# Patient Record
Sex: FEMALE | Race: ASIAN
[De-identification: names, ages, dates, MRNs, and addresses within clinical notes are randomized per-mention and may not be internally consistent; named-entity substitution may affect disease eponyms.]

---

## 2019-05-22 PROBLEM — Z00.00 ENCOUNTER FOR PREVENTIVE HEALTH EXAMINATION: Status: ACTIVE | Noted: 2019-05-22

## 2019-06-04 ENCOUNTER — APPOINTMENT (OUTPATIENT)
Dept: ANTEPARTUM | Facility: CLINIC | Age: 34
End: 2019-06-04
Payer: MEDICAID

## 2019-06-04 ENCOUNTER — APPOINTMENT (OUTPATIENT)
Dept: MATERNAL FETAL MEDICINE | Facility: CLINIC | Age: 34
End: 2019-06-04

## 2019-06-04 ENCOUNTER — ASOB RESULT (OUTPATIENT)
Age: 34
End: 2019-06-04

## 2019-06-04 PROCEDURE — 76813 OB US NUCHAL MEAS 1 GEST: CPT

## 2019-06-04 PROCEDURE — 36416 COLLJ CAPILLARY BLOOD SPEC: CPT

## 2019-06-06 ENCOUNTER — ASOB RESULT (OUTPATIENT)
Age: 34
End: 2019-06-06

## 2019-06-06 ENCOUNTER — APPOINTMENT (OUTPATIENT)
Dept: MATERNAL FETAL MEDICINE | Facility: CLINIC | Age: 34
End: 2019-06-06
Payer: MEDICAID

## 2019-06-06 PROCEDURE — 99201 OFFICE OUTPATIENT NEW 10 MINUTES: CPT

## 2019-06-15 LAB
1ST TRIMESTER DATA: NORMAL
ADDENDUM DOC: NORMAL
AFP PNL SERPL: NORMAL
AFP SERPL-ACNC: NORMAL
CLINICAL BIOCHEMIST REVIEW: NORMAL
FREE BETA HCG 1ST TRIMESTER: NORMAL
Lab: NORMAL
NOTES NTD: NORMAL
NT: NORMAL
PAPP-A SERPL-ACNC: NORMAL
TRISOMY 18/3: NORMAL

## 2019-08-07 ENCOUNTER — APPOINTMENT (OUTPATIENT)
Dept: ANTEPARTUM | Facility: CLINIC | Age: 34
End: 2019-08-07
Payer: MEDICAID

## 2019-08-07 ENCOUNTER — ASOB RESULT (OUTPATIENT)
Age: 34
End: 2019-08-07

## 2019-08-07 PROCEDURE — 76805 OB US >/= 14 WKS SNGL FETUS: CPT

## 2019-08-07 PROCEDURE — 76817 TRANSVAGINAL US OBSTETRIC: CPT

## 2019-10-31 ENCOUNTER — ASOB RESULT (OUTPATIENT)
Age: 34
End: 2019-10-31

## 2019-10-31 ENCOUNTER — APPOINTMENT (OUTPATIENT)
Dept: ANTEPARTUM | Facility: CLINIC | Age: 34
End: 2019-10-31
Payer: MEDICAID

## 2019-10-31 PROCEDURE — 76819 FETAL BIOPHYS PROFIL W/O NST: CPT

## 2019-10-31 PROCEDURE — 76816 OB US FOLLOW-UP PER FETUS: CPT

## 2019-12-10 ENCOUNTER — INPATIENT (INPATIENT)
Facility: HOSPITAL | Age: 34
LOS: 1 days | Discharge: HOME | DRG: 542 | End: 2019-12-12
Attending: OBSTETRICS & GYNECOLOGY | Admitting: OBSTETRICS & GYNECOLOGY
Payer: MEDICAID

## 2019-12-10 VITALS
HEART RATE: 65 BPM | SYSTOLIC BLOOD PRESSURE: 123 MMHG | RESPIRATION RATE: 18 BRPM | DIASTOLIC BLOOD PRESSURE: 66 MMHG | TEMPERATURE: 98 F

## 2019-12-10 DIAGNOSIS — Z34.90 ENCOUNTER FOR SUPERVISION OF NORMAL PREGNANCY, UNSPECIFIED, UNSPECIFIED TRIMESTER: ICD-10-CM

## 2019-12-10 LAB
BASOPHILS # BLD AUTO: 0.03 K/UL — SIGNIFICANT CHANGE UP (ref 0–0.2)
BASOPHILS NFR BLD AUTO: 0.3 % — SIGNIFICANT CHANGE UP (ref 0–2)
EOSINOPHIL # BLD AUTO: 0.02 K/UL — SIGNIFICANT CHANGE UP (ref 0–0.5)
EOSINOPHIL NFR BLD AUTO: 0.2 % — SIGNIFICANT CHANGE UP (ref 0–6)
HCT VFR BLD CALC: 35.2 % — SIGNIFICANT CHANGE UP (ref 34.5–45)
HGB BLD-MCNC: 11.2 G/DL — LOW (ref 11.5–15.5)
IMM GRANULOCYTES NFR BLD AUTO: 0.6 % — SIGNIFICANT CHANGE UP (ref 0–1.5)
LYMPHOCYTES # BLD AUTO: 2.69 K/UL — SIGNIFICANT CHANGE UP (ref 1–3.3)
LYMPHOCYTES # BLD AUTO: 23.4 % — SIGNIFICANT CHANGE UP (ref 13–44)
MCHC RBC-ENTMCNC: 21.6 PG — LOW (ref 27–34)
MCHC RBC-ENTMCNC: 31.8 GM/DL — LOW (ref 32–36)
MCV RBC AUTO: 67.8 FL — LOW (ref 80–100)
MONOCYTES # BLD AUTO: 0.56 K/UL — SIGNIFICANT CHANGE UP (ref 0–0.9)
MONOCYTES NFR BLD AUTO: 4.9 % — SIGNIFICANT CHANGE UP (ref 2–14)
NEUTROPHILS # BLD AUTO: 8.13 K/UL — HIGH (ref 1.8–7.4)
NEUTROPHILS NFR BLD AUTO: 70.6 % — SIGNIFICANT CHANGE UP (ref 43–77)
PLATELET # BLD AUTO: 240 K/UL — SIGNIFICANT CHANGE UP (ref 150–400)
RBC # BLD: 5.19 M/UL — SIGNIFICANT CHANGE UP (ref 3.8–5.2)
RBC # FLD: 14.6 % — HIGH (ref 10.3–14.5)
T PALLIDUM AB TITR SER: NEGATIVE — SIGNIFICANT CHANGE UP
WBC # BLD: 11.5 K/UL — HIGH (ref 3.8–10.5)
WBC # FLD AUTO: 11.5 K/UL — HIGH (ref 3.8–10.5)

## 2019-12-10 PROCEDURE — 86901 BLOOD TYPING SEROLOGIC RH(D): CPT

## 2019-12-10 PROCEDURE — G0463: CPT

## 2019-12-10 PROCEDURE — 86850 RBC ANTIBODY SCREEN: CPT

## 2019-12-10 PROCEDURE — 85014 HEMATOCRIT: CPT

## 2019-12-10 PROCEDURE — 86780 TREPONEMA PALLIDUM: CPT

## 2019-12-10 PROCEDURE — 85018 HEMOGLOBIN: CPT

## 2019-12-10 PROCEDURE — 36415 COLL VENOUS BLD VENIPUNCTURE: CPT

## 2019-12-10 PROCEDURE — C1889: CPT

## 2019-12-10 PROCEDURE — 94760 N-INVAS EAR/PLS OXIMETRY 1: CPT

## 2019-12-10 PROCEDURE — 86900 BLOOD TYPING SEROLOGIC ABO: CPT

## 2019-12-10 PROCEDURE — 59050 FETAL MONITOR W/REPORT: CPT

## 2019-12-10 PROCEDURE — 85025 COMPLETE CBC W/AUTO DIFF WBC: CPT

## 2019-12-10 RX ORDER — LANOLIN
1 OINTMENT (GRAM) TOPICAL EVERY 6 HOURS
Refills: 0 | Status: DISCONTINUED | OUTPATIENT
Start: 2019-12-10 | End: 2019-12-12

## 2019-12-10 RX ORDER — IBUPROFEN 200 MG
600 TABLET ORAL EVERY 6 HOURS
Refills: 0 | Status: DISCONTINUED | OUTPATIENT
Start: 2019-12-10 | End: 2019-12-12

## 2019-12-10 RX ORDER — HYDROCORTISONE 1 %
1 OINTMENT (GRAM) TOPICAL EVERY 6 HOURS
Refills: 0 | Status: DISCONTINUED | OUTPATIENT
Start: 2019-12-10 | End: 2019-12-12

## 2019-12-10 RX ORDER — GLYCERIN ADULT
1 SUPPOSITORY, RECTAL RECTAL AT BEDTIME
Refills: 0 | Status: DISCONTINUED | OUTPATIENT
Start: 2019-12-10 | End: 2019-12-10

## 2019-12-10 RX ORDER — SIMETHICONE 80 MG/1
80 TABLET, CHEWABLE ORAL EVERY 4 HOURS
Refills: 0 | Status: DISCONTINUED | OUTPATIENT
Start: 2019-12-10 | End: 2019-12-12

## 2019-12-10 RX ORDER — IBUPROFEN 200 MG
600 TABLET ORAL EVERY 6 HOURS
Refills: 0 | Status: COMPLETED | OUTPATIENT
Start: 2019-12-10 | End: 2020-11-07

## 2019-12-10 RX ORDER — DIBUCAINE 1 %
1 OINTMENT (GRAM) RECTAL EVERY 6 HOURS
Refills: 0 | Status: DISCONTINUED | OUTPATIENT
Start: 2019-12-10 | End: 2019-12-12

## 2019-12-10 RX ORDER — AER TRAVELER 0.5 G/1
1 SOLUTION RECTAL; TOPICAL EVERY 4 HOURS
Refills: 0 | Status: DISCONTINUED | OUTPATIENT
Start: 2019-12-10 | End: 2019-12-12

## 2019-12-10 RX ORDER — DIPHENHYDRAMINE HCL 50 MG
25 CAPSULE ORAL EVERY 6 HOURS
Refills: 0 | Status: DISCONTINUED | OUTPATIENT
Start: 2019-12-10 | End: 2019-12-12

## 2019-12-10 RX ORDER — TETANUS TOXOID, REDUCED DIPHTHERIA TOXOID AND ACELLULAR PERTUSSIS VACCINE, ADSORBED 5; 2.5; 8; 8; 2.5 [IU]/.5ML; [IU]/.5ML; UG/.5ML; UG/.5ML; UG/.5ML
0.5 SUSPENSION INTRAMUSCULAR ONCE
Refills: 0 | Status: DISCONTINUED | OUTPATIENT
Start: 2019-12-10 | End: 2019-12-12

## 2019-12-10 RX ORDER — OXYTOCIN 10 UNIT/ML
333.33 VIAL (ML) INJECTION
Qty: 20 | Refills: 0 | Status: DISCONTINUED | OUTPATIENT
Start: 2019-12-10 | End: 2019-12-12

## 2019-12-10 RX ORDER — ACETAMINOPHEN 500 MG
975 TABLET ORAL
Refills: 0 | Status: DISCONTINUED | OUTPATIENT
Start: 2019-12-10 | End: 2019-12-12

## 2019-12-10 RX ORDER — BENZOCAINE 10 %
1 GEL (GRAM) MUCOUS MEMBRANE EVERY 6 HOURS
Refills: 0 | Status: DISCONTINUED | OUTPATIENT
Start: 2019-12-10 | End: 2019-12-12

## 2019-12-10 RX ORDER — CITRIC ACID/SODIUM CITRATE 300-500 MG
15 SOLUTION, ORAL ORAL EVERY 6 HOURS
Refills: 0 | Status: DISCONTINUED | OUTPATIENT
Start: 2019-12-10 | End: 2019-12-10

## 2019-12-10 RX ORDER — SODIUM CHLORIDE 9 MG/ML
1000 INJECTION, SOLUTION INTRAVENOUS
Refills: 0 | Status: DISCONTINUED | OUTPATIENT
Start: 2019-12-10 | End: 2019-12-10

## 2019-12-10 RX ORDER — BUTORPHANOL TARTRATE 2 MG/ML
2 INJECTION, SOLUTION INTRAMUSCULAR; INTRAVENOUS ONCE
Refills: 0 | Status: DISCONTINUED | OUTPATIENT
Start: 2019-12-10 | End: 2019-12-10

## 2019-12-10 RX ORDER — PRAMOXINE HYDROCHLORIDE 150 MG/15G
1 AEROSOL, FOAM RECTAL EVERY 4 HOURS
Refills: 0 | Status: DISCONTINUED | OUTPATIENT
Start: 2019-12-10 | End: 2019-12-12

## 2019-12-10 RX ORDER — SODIUM CHLORIDE 9 MG/ML
3 INJECTION INTRAMUSCULAR; INTRAVENOUS; SUBCUTANEOUS EVERY 8 HOURS
Refills: 0 | Status: DISCONTINUED | OUTPATIENT
Start: 2019-12-10 | End: 2019-12-12

## 2019-12-10 RX ORDER — KETOROLAC TROMETHAMINE 30 MG/ML
30 SYRINGE (ML) INJECTION ONCE
Refills: 0 | Status: DISCONTINUED | OUTPATIENT
Start: 2019-12-10 | End: 2019-12-10

## 2019-12-10 RX ORDER — MAGNESIUM HYDROXIDE 400 MG/1
30 TABLET, CHEWABLE ORAL
Refills: 0 | Status: DISCONTINUED | OUTPATIENT
Start: 2019-12-10 | End: 2019-12-12

## 2019-12-10 RX ADMIN — Medication 1000 MILLIUNIT(S)/MIN: at 20:30

## 2019-12-10 RX ADMIN — SODIUM CHLORIDE 3 MILLILITER(S): 9 INJECTION INTRAMUSCULAR; INTRAVENOUS; SUBCUTANEOUS at 22:06

## 2019-12-10 RX ADMIN — SODIUM CHLORIDE 125 MILLILITER(S): 9 INJECTION, SOLUTION INTRAVENOUS at 08:53

## 2019-12-10 RX ADMIN — Medication 975 MILLIGRAM(S): at 22:08

## 2019-12-10 RX ADMIN — Medication 975 MILLIGRAM(S): at 23:00

## 2019-12-10 RX ADMIN — BUTORPHANOL TARTRATE 2 MILLIGRAM(S): 2 INJECTION, SOLUTION INTRAMUSCULAR; INTRAVENOUS at 08:50

## 2019-12-10 RX ADMIN — Medication 30 MILLIGRAM(S): at 17:49

## 2019-12-10 RX ADMIN — SODIUM CHLORIDE 125 MILLILITER(S): 9 INJECTION, SOLUTION INTRAVENOUS at 14:40

## 2019-12-10 RX ADMIN — BUTORPHANOL TARTRATE 2 MILLIGRAM(S): 2 INJECTION, SOLUTION INTRAMUSCULAR; INTRAVENOUS at 10:00

## 2019-12-11 ENCOUNTER — TRANSCRIPTION ENCOUNTER (OUTPATIENT)
Age: 34
End: 2019-12-11

## 2019-12-11 LAB
HCT VFR BLD CALC: 28.4 % — LOW (ref 34.5–45)
HGB BLD-MCNC: 9 G/DL — LOW (ref 11.5–15.5)

## 2019-12-11 RX ORDER — ACETAMINOPHEN 500 MG
3 TABLET ORAL
Qty: 0 | Refills: 0 | DISCHARGE
Start: 2019-12-11

## 2019-12-11 RX ORDER — BENZOCAINE 10 %
1 GEL (GRAM) MUCOUS MEMBRANE
Qty: 0 | Refills: 0 | DISCHARGE
Start: 2019-12-11

## 2019-12-11 RX ORDER — HYDROCORTISONE 1 %
1 OINTMENT (GRAM) TOPICAL
Qty: 0 | Refills: 0 | DISCHARGE
Start: 2019-12-11

## 2019-12-11 RX ORDER — AER TRAVELER 0.5 G/1
1 SOLUTION RECTAL; TOPICAL
Qty: 0 | Refills: 0 | DISCHARGE
Start: 2019-12-11

## 2019-12-11 RX ORDER — DIBUCAINE 1 %
1 OINTMENT (GRAM) RECTAL
Qty: 0 | Refills: 0 | DISCHARGE
Start: 2019-12-11

## 2019-12-11 RX ORDER — LANOLIN
1 OINTMENT (GRAM) TOPICAL
Qty: 0 | Refills: 0 | DISCHARGE
Start: 2019-12-11

## 2019-12-11 RX ORDER — IBUPROFEN 200 MG
1 TABLET ORAL
Qty: 0 | Refills: 0 | DISCHARGE
Start: 2019-12-11

## 2019-12-11 RX ORDER — PRAMOXINE HYDROCHLORIDE 150 MG/15G
1 AEROSOL, FOAM RECTAL
Qty: 0 | Refills: 0 | DISCHARGE
Start: 2019-12-11

## 2019-12-11 RX ORDER — SIMETHICONE 80 MG/1
1 TABLET, CHEWABLE ORAL
Qty: 0 | Refills: 0 | DISCHARGE
Start: 2019-12-11

## 2019-12-11 RX ADMIN — Medication 600 MILLIGRAM(S): at 14:58

## 2019-12-11 RX ADMIN — Medication 600 MILLIGRAM(S): at 04:59

## 2019-12-11 RX ADMIN — Medication 600 MILLIGRAM(S): at 15:00

## 2019-12-11 RX ADMIN — Medication 600 MILLIGRAM(S): at 04:24

## 2019-12-11 RX ADMIN — SODIUM CHLORIDE 3 MILLILITER(S): 9 INJECTION INTRAMUSCULAR; INTRAVENOUS; SUBCUTANEOUS at 22:00

## 2019-12-11 NOTE — DISCHARGE NOTE OB - PATIENT PORTAL LINK FT
You can access the FollowMyHealth Patient Portal offered by [facility] by registering at the following website: [email or web address]. By joining [facility]’s FollowMyHealth portal, you will also be able to view your health information using other applications (apps) compatible with our system.

## 2019-12-11 NOTE — DISCHARGE NOTE OB - CARE PROVIDER_API CALL
Lesley Braxton)  Obstetrics and Gynecology  [address], [address]  [place] [address]  Phone: [phone]  Fax: [phone]  Follow Up Time:

## 2019-12-11 NOTE — DISCHARGE NOTE OB - MEDICATION SUMMARY - MEDICATIONS TO TAKE
I will START or STAY ON the medications listed below when I get home from the hospital:    ibuprofen 600 mg oral tablet  -- 1 tab(s) by mouth every 6 hours, As needed, Moderate Pain (4 - 6)  -- Indication: For vaginal delivery /delivered     acetaminophen 325 mg oral tablet  -- 3 tab(s) by mouth , As needed, Moderate Pain (4 - 6)  -- Indication: For vaginal delivery /delivery     benzocaine 20% topical spray  -- 1 spray(s) on skin every 6 hours, As needed, for Perineal discomfort  -- Indication: For vaginal delivery /delivered     dibucaine 1% topical ointment  -- 1 application on skin every 6 hours, As needed, Perineal discomfort  -- Indication: For vaginal delivery/delivered     pramoxine 1% topical cream  -- 1 application on skin every 4 hours, As needed, Moderate Pain (4-6)  -- Indication: For vaginal delivery/delivered     hydrocortisone 1% topical cream  -- 1 application on skin every 6 hours, As needed, Moderate Pain (4-6)  -- Indication: For vaginal delivery /delivered     lanolin topical ointment  -- 1 application on skin every 6 hours, As needed, nipple soreness  -- Indication: For vaginal delivery/delivered     witch hazel 50% topical pad  -- 1 application on skin every 4 hours, As needed, Perineal discomfort  -- Indication: For vaginal delivery /delivered     Prenatal Multivitamins with Folic Acid 1 mg oral tablet  -- 1 tab(s) by mouth once a day  -- Indication: For vaginal dleivery /delivered     simethicone 80 mg oral tablet, chewable  -- 1 tab(s) by mouth every 4 hours, As needed, Gas  -- Indication: For vaginal delivery /delivered

## 2019-12-11 NOTE — PROGRESS NOTE ADULT - SUBJECTIVE AND OBJECTIVE BOX
pt is s/p  day # 1   c/o pain in her perineum area but she  said it is controlled when she takes pain medicine     on exam vss   breast nml b/l   abd is soft and ut is firm   perineum is in healing process   no calf tenderness b/l   a/p   s/p  day #1   3rd degree laceration in a healing process   routine post partum care   caleb loya md

## 2019-12-12 VITALS
DIASTOLIC BLOOD PRESSURE: 67 MMHG | RESPIRATION RATE: 16 BRPM | OXYGEN SATURATION: 99 % | HEART RATE: 65 BPM | TEMPERATURE: 98 F | SYSTOLIC BLOOD PRESSURE: 113 MMHG

## 2019-12-12 RX ADMIN — Medication 600 MILLIGRAM(S): at 06:34

## 2019-12-12 NOTE — PROGRESS NOTE ADULT - SUBJECTIVE AND OBJECTIVE BOX
Postpartum Note,     Subjective: 33yo  female, now PPD#2 s/p , seen and examined at bedside. No acute events overnight. Pain well controlled with current pain regimen. She is ambulating well and tolerating PO diet/fluids. She reports normal postpartum bleeding, describing bleed no more than regular menstrual period. She is voiding well and reports flatus/BM. Reports breastfeeding without difficulties and is supplementing with formula feeds. Denies fever, headache, changes in vision, chest pain, SOB, nausea, vomiting. Otherwise, patient feels well without additional complaints.       Physical exam:    Vital Signs Last 24 Hrs  T(C): 36.6 (12 Dec 2019 07:50), Max: 36.6 (11 Dec 2019 20:10)  T(F): 97.8 (12 Dec 2019 07:50), Max: 97.8 (11 Dec 2019 20:10)  HR: 65 (12 Dec 2019 07:50) (65 - 79)  BP: 113/67 (12 Dec 2019 07:50) (113/67 - 118/67)  RR: 16 (12 Dec 2019 07:50) (16 - 16)  SpO2: 99% (12 Dec 2019 07:50) (99% - 99%)    Gen: NAD  Breast: Soft, nontender, not engorged  Cardio: S1,S2 no murmurs  Lungs: CTA B/L, no wheezing  Abdomen: Soft, nontender, no distension, firm uterine fundus below the umbilicus.  Pelvic: Normal lochia noted  Ext: No calf tenderness bilaterally      LABS:                        9.0    x     )-----------( x        ( 11 Dec 2019 08:27 )             28.4           Allergies  No Known Allergies      MEDICATIONS  (STANDING):  diphtheria/tetanus/pertussis (acellular) Vaccine (ADAcel) 0.5 milliLiter(s) IntraMuscular once  oxytocin Infusion 333.333 milliUNIT(s)/Min (1000 mL/Hr) IV Continuous <Continuous>  oxytocin Infusion 333.333 milliUNIT(s)/Min (1000 mL/Hr) IV Continuous <Continuous>  prenatal multivitamin 1 Tablet(s) Oral daily  sodium chloride 0.9% lock flush 3 milliLiter(s) IV Push every 8 hours    MEDICATIONS  (PRN):  acetaminophen   Tablet .. 975 milliGRAM(s) Oral <User Schedule> PRN Moderate Pain (4 - 6)  benzocaine 20%/menthol 0.5% Spray 1 Spray(s) Topical every 6 hours PRN for Perineal discomfort  dibucaine 1% Ointment 1 Application(s) Topical every 6 hours PRN Perineal discomfort  diphenhydrAMINE 25 milliGRAM(s) Oral every 6 hours PRN Pruritus  hydrocortisone 1% Cream 1 Application(s) Topical every 6 hours PRN Moderate Pain (4-6)  ibuprofen  Tablet. 600 milliGRAM(s) Oral every 6 hours PRN Moderate Pain (4 - 6)  lanolin Ointment 1 Application(s) Topical every 6 hours PRN nipple soreness  magnesium hydroxide Suspension 30 milliLiter(s) Oral two times a day PRN Constipation  pramoxine 1%/zinc 5% Cream 1 Application(s) Topical every 4 hours PRN Moderate Pain (4-6)  simethicone 80 milliGRAM(s) Chew every 4 hours PRN Gas  witch hazel Pads 1 Application(s) Topical every 4 hours PRN Perineal discomfort        Assessment and Plan  33yo  female, now PPD#2 s/p , currently feeling well without complaints at this time.    -Routine post-partum care  -Pain well controlled, continue current pain regimen  -Encouraged ambulation  -Encouraged PO diet/fluids  -Encouraged breastfeeding  -Likely d/c today

## 2019-12-15 DIAGNOSIS — Z23 ENCOUNTER FOR IMMUNIZATION: ICD-10-CM

## 2019-12-15 DIAGNOSIS — Z3A.39 39 WEEKS GESTATION OF PREGNANCY: ICD-10-CM

## 2021-10-04 ENCOUNTER — APPOINTMENT (OUTPATIENT)
Dept: ANTEPARTUM | Facility: CLINIC | Age: 36
End: 2021-10-04
Payer: COMMERCIAL

## 2021-10-04 ENCOUNTER — ASOB RESULT (OUTPATIENT)
Age: 36
End: 2021-10-04

## 2021-10-04 PROCEDURE — 36416 COLLJ CAPILLARY BLOOD SPEC: CPT

## 2021-10-04 PROCEDURE — 76813 OB US NUCHAL MEAS 1 GEST: CPT

## 2021-10-08 LAB
1ST TRIMESTER DATA: NORMAL
ADDENDUM DOC: NORMAL
AFP PNL SERPL: NORMAL
AFP SERPL-ACNC: NORMAL
CLINICAL BIOCHEMIST REVIEW: NORMAL
FREE BETA HCG 1ST TRIMESTER: NORMAL
Lab: NORMAL
NASAL BONE: PRESENT
NOTES NTD: NORMAL
NT: NORMAL
PAPP-A SERPL-ACNC: NORMAL
TRISOMY 18/3: NORMAL

## 2021-11-30 ENCOUNTER — ASOB RESULT (OUTPATIENT)
Age: 36
End: 2021-11-30

## 2021-11-30 ENCOUNTER — APPOINTMENT (OUTPATIENT)
Dept: ANTEPARTUM | Facility: CLINIC | Age: 36
End: 2021-11-30
Payer: MEDICAID

## 2021-11-30 PROCEDURE — 76811 OB US DETAILED SNGL FETUS: CPT

## 2021-11-30 PROCEDURE — 76817 TRANSVAGINAL US OBSTETRIC: CPT

## 2021-12-01 ENCOUNTER — APPOINTMENT (OUTPATIENT)
Dept: MATERNAL FETAL MEDICINE | Facility: CLINIC | Age: 36
End: 2021-12-01
Payer: MEDICAID

## 2021-12-01 ENCOUNTER — ASOB RESULT (OUTPATIENT)
Age: 36
End: 2021-12-01

## 2021-12-01 PROCEDURE — 99202 OFFICE O/P NEW SF 15 MIN: CPT | Mod: 95

## 2021-12-01 PROCEDURE — 99212 OFFICE O/P EST SF 10 MIN: CPT | Mod: 95

## 2021-12-03 ENCOUNTER — APPOINTMENT (OUTPATIENT)
Dept: ANTEPARTUM | Facility: CLINIC | Age: 36
End: 2021-12-03

## 2021-12-07 ENCOUNTER — APPOINTMENT (OUTPATIENT)
Dept: ANTEPARTUM | Facility: CLINIC | Age: 36
End: 2021-12-07
Payer: MEDICAID

## 2021-12-07 ENCOUNTER — ASOB RESULT (OUTPATIENT)
Age: 36
End: 2021-12-07

## 2021-12-07 PROCEDURE — 76816 OB US FOLLOW-UP PER FETUS: CPT

## 2021-12-13 ENCOUNTER — NON-APPOINTMENT (OUTPATIENT)
Age: 36
End: 2021-12-13

## 2021-12-30 ENCOUNTER — APPOINTMENT (OUTPATIENT)
Dept: PEDIATRIC CARDIOLOGY | Facility: CLINIC | Age: 36
End: 2021-12-30
Payer: MEDICAID

## 2021-12-30 DIAGNOSIS — R93.89 ABNORMAL FINDINGS ON DIAGNOSTIC IMAGING OF OTHER SPECIFIED BODY STRUCTURES: ICD-10-CM

## 2021-12-30 DIAGNOSIS — O35.1XX0 MATERNAL CARE FOR (SUSPECTED) CHROMOSOMAL ABNORMALITY IN FETUS, NOT APPLICABLE OR UNSPECIFIED: ICD-10-CM

## 2021-12-30 DIAGNOSIS — O35.8XX0 MATERNAL CARE FOR OTHER (SUSPECTED) FETAL ABNORMALITY AND DAMAGE, NOT APPLICABLE OR UNSPECIFIED: ICD-10-CM

## 2021-12-30 PROCEDURE — 99203 OFFICE O/P NEW LOW 30 MIN: CPT | Mod: 25

## 2021-12-30 PROCEDURE — 76820 UMBILICAL ARTERY ECHO: CPT

## 2021-12-30 PROCEDURE — 76827 ECHO EXAM OF FETAL HEART: CPT

## 2021-12-30 PROCEDURE — 76821 MIDDLE CEREBRAL ARTERY ECHO: CPT

## 2021-12-30 PROCEDURE — 76825 ECHO EXAM OF FETAL HEART: CPT

## 2021-12-30 PROCEDURE — 93325 DOPPLER ECHO COLOR FLOW MAPG: CPT | Mod: 59

## 2022-01-06 ENCOUNTER — APPOINTMENT (OUTPATIENT)
Dept: PEDIATRIC CARDIOLOGY | Facility: CLINIC | Age: 37
End: 2022-01-06

## 2022-02-15 ENCOUNTER — ASOB RESULT (OUTPATIENT)
Age: 37
End: 2022-02-15

## 2022-02-15 ENCOUNTER — APPOINTMENT (OUTPATIENT)
Dept: ANTEPARTUM | Facility: CLINIC | Age: 37
End: 2022-02-15
Payer: MEDICAID

## 2022-02-15 PROCEDURE — 76816 OB US FOLLOW-UP PER FETUS: CPT

## 2022-04-10 ENCOUNTER — INPATIENT (INPATIENT)
Facility: HOSPITAL | Age: 37
LOS: 1 days | Discharge: HOME | DRG: 542 | End: 2022-04-12
Attending: OBSTETRICS & GYNECOLOGY | Admitting: OBSTETRICS & GYNECOLOGY
Payer: MEDICAID

## 2022-04-10 VITALS — HEIGHT: 67 IN | WEIGHT: 162.04 LBS

## 2022-04-10 DIAGNOSIS — Z3A.00 WEEKS OF GESTATION OF PREGNANCY NOT SPECIFIED: ICD-10-CM

## 2022-04-10 LAB
BASOPHILS # BLD AUTO: 0.03 K/UL — SIGNIFICANT CHANGE UP (ref 0–0.2)
BASOPHILS NFR BLD AUTO: 0.2 % — SIGNIFICANT CHANGE UP (ref 0–2)
EOSINOPHIL # BLD AUTO: 0.01 K/UL — SIGNIFICANT CHANGE UP (ref 0–0.5)
EOSINOPHIL NFR BLD AUTO: 0.1 % — SIGNIFICANT CHANGE UP (ref 0–6)
HCT VFR BLD CALC: 31.7 % — LOW (ref 34.5–45)
HGB BLD-MCNC: 9.6 G/DL — LOW (ref 11.5–15.5)
IMM GRANULOCYTES NFR BLD AUTO: 0.3 % — SIGNIFICANT CHANGE UP (ref 0–1.5)
LYMPHOCYTES # BLD AUTO: 1.87 K/UL — SIGNIFICANT CHANGE UP (ref 1–3.3)
LYMPHOCYTES # BLD AUTO: 12.8 % — LOW (ref 13–44)
MCHC RBC-ENTMCNC: 17.9 PG — LOW (ref 27–34)
MCHC RBC-ENTMCNC: 30.3 GM/DL — LOW (ref 32–36)
MCV RBC AUTO: 59 FL — LOW (ref 80–100)
MONOCYTES # BLD AUTO: 0.52 K/UL — SIGNIFICANT CHANGE UP (ref 0–0.9)
MONOCYTES NFR BLD AUTO: 3.6 % — SIGNIFICANT CHANGE UP (ref 2–14)
NEUTROPHILS # BLD AUTO: 12.08 K/UL — HIGH (ref 1.8–7.4)
NEUTROPHILS NFR BLD AUTO: 83 % — HIGH (ref 43–77)
PLATELET # BLD AUTO: 310 K/UL — SIGNIFICANT CHANGE UP (ref 150–400)
RBC # BLD: 5.37 M/UL — HIGH (ref 3.8–5.2)
RBC # FLD: 15.6 % — HIGH (ref 10.3–14.5)
SARS-COV-2 RNA SPEC QL NAA+PROBE: SIGNIFICANT CHANGE UP
WBC # BLD: 14.56 K/UL — HIGH (ref 3.8–10.5)
WBC # FLD AUTO: 14.56 K/UL — HIGH (ref 3.8–10.5)

## 2022-04-10 PROCEDURE — U0005: CPT

## 2022-04-10 PROCEDURE — 85018 HEMOGLOBIN: CPT

## 2022-04-10 PROCEDURE — 86769 SARS-COV-2 COVID-19 ANTIBODY: CPT

## 2022-04-10 PROCEDURE — 86780 TREPONEMA PALLIDUM: CPT

## 2022-04-10 PROCEDURE — U0003: CPT

## 2022-04-10 PROCEDURE — G0463: CPT

## 2022-04-10 PROCEDURE — 86901 BLOOD TYPING SEROLOGIC RH(D): CPT

## 2022-04-10 PROCEDURE — 85025 COMPLETE CBC W/AUTO DIFF WBC: CPT

## 2022-04-10 PROCEDURE — 85014 HEMATOCRIT: CPT

## 2022-04-10 PROCEDURE — 86900 BLOOD TYPING SEROLOGIC ABO: CPT

## 2022-04-10 PROCEDURE — 36415 COLL VENOUS BLD VENIPUNCTURE: CPT

## 2022-04-10 PROCEDURE — 59050 FETAL MONITOR W/REPORT: CPT

## 2022-04-10 PROCEDURE — 86850 RBC ANTIBODY SCREEN: CPT

## 2022-04-10 RX ORDER — OXYTOCIN 10 UNIT/ML
333.33 VIAL (ML) INJECTION
Qty: 20 | Refills: 0 | Status: DISCONTINUED | OUTPATIENT
Start: 2022-04-10 | End: 2022-04-10

## 2022-04-10 RX ORDER — PRAMOXINE HYDROCHLORIDE 150 MG/15G
1 AEROSOL, FOAM RECTAL EVERY 4 HOURS
Refills: 0 | Status: DISCONTINUED | OUTPATIENT
Start: 2022-04-10 | End: 2022-04-12

## 2022-04-10 RX ORDER — SIMETHICONE 80 MG/1
80 TABLET, CHEWABLE ORAL EVERY 4 HOURS
Refills: 0 | Status: DISCONTINUED | OUTPATIENT
Start: 2022-04-10 | End: 2022-04-12

## 2022-04-10 RX ORDER — IBUPROFEN 200 MG
600 TABLET ORAL EVERY 6 HOURS
Refills: 0 | Status: DISCONTINUED | OUTPATIENT
Start: 2022-04-10 | End: 2022-04-12

## 2022-04-10 RX ORDER — IBUPROFEN 200 MG
600 TABLET ORAL EVERY 6 HOURS
Refills: 0 | Status: COMPLETED | OUTPATIENT
Start: 2022-04-10 | End: 2023-03-09

## 2022-04-10 RX ORDER — DIPHENHYDRAMINE HCL 50 MG
25 CAPSULE ORAL EVERY 6 HOURS
Refills: 0 | Status: DISCONTINUED | OUTPATIENT
Start: 2022-04-10 | End: 2022-04-12

## 2022-04-10 RX ORDER — CITRIC ACID/SODIUM CITRATE 300-500 MG
30 SOLUTION, ORAL ORAL ONCE
Refills: 0 | Status: DISCONTINUED | OUTPATIENT
Start: 2022-04-10 | End: 2022-04-10

## 2022-04-10 RX ORDER — ACETAMINOPHEN 500 MG
975 TABLET ORAL
Refills: 0 | Status: DISCONTINUED | OUTPATIENT
Start: 2022-04-10 | End: 2022-04-12

## 2022-04-10 RX ORDER — AMPICILLIN TRIHYDRATE 250 MG
1 CAPSULE ORAL EVERY 4 HOURS
Refills: 0 | Status: DISCONTINUED | OUTPATIENT
Start: 2022-04-10 | End: 2022-04-10

## 2022-04-10 RX ORDER — OXYCODONE HYDROCHLORIDE 5 MG/1
5 TABLET ORAL ONCE
Refills: 0 | Status: DISCONTINUED | OUTPATIENT
Start: 2022-04-10 | End: 2022-04-12

## 2022-04-10 RX ORDER — SODIUM CHLORIDE 9 MG/ML
1000 INJECTION, SOLUTION INTRAVENOUS
Refills: 0 | Status: DISCONTINUED | OUTPATIENT
Start: 2022-04-10 | End: 2022-04-10

## 2022-04-10 RX ORDER — OXYCODONE HYDROCHLORIDE 5 MG/1
5 TABLET ORAL
Refills: 0 | Status: DISCONTINUED | OUTPATIENT
Start: 2022-04-10 | End: 2022-04-12

## 2022-04-10 RX ORDER — AER TRAVELER 0.5 G/1
1 SOLUTION RECTAL; TOPICAL EVERY 4 HOURS
Refills: 0 | Status: DISCONTINUED | OUTPATIENT
Start: 2022-04-10 | End: 2022-04-12

## 2022-04-10 RX ORDER — BENZOCAINE 10 %
1 GEL (GRAM) MUCOUS MEMBRANE EVERY 6 HOURS
Refills: 0 | Status: DISCONTINUED | OUTPATIENT
Start: 2022-04-10 | End: 2022-04-12

## 2022-04-10 RX ORDER — SODIUM CHLORIDE 9 MG/ML
3 INJECTION INTRAMUSCULAR; INTRAVENOUS; SUBCUTANEOUS EVERY 8 HOURS
Refills: 0 | Status: DISCONTINUED | OUTPATIENT
Start: 2022-04-10 | End: 2022-04-12

## 2022-04-10 RX ORDER — HYDROCORTISONE 1 %
1 OINTMENT (GRAM) TOPICAL EVERY 6 HOURS
Refills: 0 | Status: DISCONTINUED | OUTPATIENT
Start: 2022-04-10 | End: 2022-04-12

## 2022-04-10 RX ORDER — TETANUS TOXOID, REDUCED DIPHTHERIA TOXOID AND ACELLULAR PERTUSSIS VACCINE, ADSORBED 5; 2.5; 8; 8; 2.5 [IU]/.5ML; [IU]/.5ML; UG/.5ML; UG/.5ML; UG/.5ML
0.5 SUSPENSION INTRAMUSCULAR ONCE
Refills: 0 | Status: DISCONTINUED | OUTPATIENT
Start: 2022-04-10 | End: 2022-04-12

## 2022-04-10 RX ORDER — MAGNESIUM HYDROXIDE 400 MG/1
30 TABLET, CHEWABLE ORAL
Refills: 0 | Status: DISCONTINUED | OUTPATIENT
Start: 2022-04-10 | End: 2022-04-12

## 2022-04-10 RX ORDER — OXYTOCIN 10 UNIT/ML
333.33 VIAL (ML) INJECTION
Qty: 20 | Refills: 0 | Status: DISCONTINUED | OUTPATIENT
Start: 2022-04-10 | End: 2022-04-12

## 2022-04-10 RX ORDER — KETOROLAC TROMETHAMINE 30 MG/ML
30 SYRINGE (ML) INJECTION ONCE
Refills: 0 | Status: DISCONTINUED | OUTPATIENT
Start: 2022-04-10 | End: 2022-04-10

## 2022-04-10 RX ORDER — LANOLIN
1 OINTMENT (GRAM) TOPICAL EVERY 6 HOURS
Refills: 0 | Status: DISCONTINUED | OUTPATIENT
Start: 2022-04-10 | End: 2022-04-12

## 2022-04-10 RX ORDER — DIBUCAINE 1 %
1 OINTMENT (GRAM) RECTAL EVERY 6 HOURS
Refills: 0 | Status: DISCONTINUED | OUTPATIENT
Start: 2022-04-10 | End: 2022-04-12

## 2022-04-10 RX ORDER — AMPICILLIN TRIHYDRATE 250 MG
2 CAPSULE ORAL ONCE
Refills: 0 | Status: COMPLETED | OUTPATIENT
Start: 2022-04-10 | End: 2022-04-10

## 2022-04-10 RX ADMIN — Medication 30 MILLIGRAM(S): at 18:41

## 2022-04-10 RX ADMIN — Medication 1000 MILLIUNIT(S)/MIN: at 18:40

## 2022-04-10 RX ADMIN — Medication 975 MILLIGRAM(S): at 21:21

## 2022-04-10 RX ADMIN — SODIUM CHLORIDE 3 MILLILITER(S): 9 INJECTION INTRAMUSCULAR; INTRAVENOUS; SUBCUTANEOUS at 21:16

## 2022-04-10 RX ADMIN — Medication 216 GRAM(S): at 17:40

## 2022-04-10 NOTE — PATIENT PROFILE OB - NS PRO TDAP RECEIVED Y/N
CONST: No fever, chills or body aches  EYES: No pain, redness, drainage or visual changes.  CARD: No chest pain, palpitations  RESP: No SOB, cough  GI: No abdominal pain, N/V/D  MS: + right lateral rib pain. No joint pain, or extremity pain/injury  SKIN: No rashes  NEURO: No headache, dizziness, paresthesias or LOC yes...

## 2022-04-11 LAB
COVID-19 SPIKE DOMAIN AB INTERP: POSITIVE
COVID-19 SPIKE DOMAIN ANTIBODY RESULT: >250 U/ML — HIGH
HCT VFR BLD CALC: 27.6 % — LOW (ref 34.5–45)
HGB BLD-MCNC: 8.4 G/DL — LOW (ref 11.5–15.5)
SARS-COV-2 IGG+IGM SERPL QL IA: >250 U/ML — HIGH
SARS-COV-2 IGG+IGM SERPL QL IA: POSITIVE
T PALLIDUM AB TITR SER: NEGATIVE — SIGNIFICANT CHANGE UP

## 2022-04-11 RX ADMIN — Medication 600 MILLIGRAM(S): at 00:07

## 2022-04-11 RX ADMIN — Medication 600 MILLIGRAM(S): at 05:55

## 2022-04-11 RX ADMIN — Medication 1 TABLET(S): at 12:16

## 2022-04-11 RX ADMIN — SODIUM CHLORIDE 3 MILLILITER(S): 9 INJECTION INTRAMUSCULAR; INTRAVENOUS; SUBCUTANEOUS at 05:23

## 2022-04-11 RX ADMIN — Medication 600 MILLIGRAM(S): at 12:15

## 2022-04-11 RX ADMIN — Medication 975 MILLIGRAM(S): at 21:10

## 2022-04-11 NOTE — PROGRESS NOTE ADULT - ASSESSMENT
Assessment:    37y  s/p uncomplicated NVD PPD# 1.      Plan:  1. Routine post-partum care.   2. Encourage ambulation.  3. Normal diet.   4. Pain is well controlled. Continue pain management PRN.  5. Plan for discharge tomorrow

## 2022-04-12 ENCOUNTER — TRANSCRIPTION ENCOUNTER (OUTPATIENT)
Age: 37
End: 2022-04-12

## 2022-04-12 VITALS
DIASTOLIC BLOOD PRESSURE: 95 MMHG | HEART RATE: 79 BPM | TEMPERATURE: 97 F | OXYGEN SATURATION: 99 % | SYSTOLIC BLOOD PRESSURE: 112 MMHG | RESPIRATION RATE: 16 BRPM

## 2022-04-12 RX ADMIN — Medication 600 MILLIGRAM(S): at 06:12

## 2022-04-12 RX ADMIN — Medication 975 MILLIGRAM(S): at 03:08

## 2022-04-12 RX ADMIN — MAGNESIUM HYDROXIDE 30 MILLILITER(S): 400 TABLET, CHEWABLE ORAL at 06:19

## 2022-04-12 NOTE — PROGRESS NOTE ADULT - SUBJECTIVE AND OBJECTIVE BOX
S: Patient evaluated at bedside. She has no complaints. She reports pain is well controlled with pain meds. She denies headache, dizziness, chest pain, palpitations, shortness of breath, nausea, vomiting, heavy vaginal bleeding. She has been ambulating without assistance, voiding spontaneously, tolerating diet, and is breastfeeding. Feeling constipated and was given stool softeners. Passing flatus    O: Vital Signs Last 24 Hrs  T(C): 36.8 (2022 19:28), Max: 36.8 (2022 19:28)  T(F): 98.2 (2022 19:28), Max: 98.2 (2022 19:28)  HR: 73 (2022 19:28) (73 - 83)  BP: 110/62 (2022 19:28) (101/49 - 110/62)  BP(mean): --  RR: 16 (2022 19:28) (16 - 17)  SpO2: 98% (2022 19:28) (96% - 98%)    Gen: NAD  Abd: Soft, NT/ND, fundus firm  Ext: No tenderness, no edema    Labs:                        8.4    x     )-----------( x        ( 2022 07:53 )             27.6       A: 37y PPD#2 s/p  doing well.    Plan:  - Routine postpartum care  - Encouraged out of bed  - Regular diet
Postpartum Note Vaginal Delivery    ID: ELDER CANAS is a 37y   s/p  PPD#1.     Subjective:  Patient is doing well. No acute events overnight. No problems with ambulating, voiding, or PO intake. Pain is well  controlled. +BM.  Lochia: minimal. Denies any nausea, vomiting, HA, blurry vision, SOB, CP, RUQ pain, or edema.       Objective:   T(F): 97.8 (22 @ 05:00)  HR: 78 (22 @ 05:00)  BP: 103/52 (22 @ 05:00)  RR: 16 (22 @ 05:00)  SpO2: 97% (22 @ 05:00)    PE:   General: in no apparent distress, well developed and nourished, alert.   Abdomen: Soft, nontender, non-distended  Fundus: firm, below umbilicus.   Ext: No cyanosis, clubbing, or edema. DP pulses palpated bilaterally.        LABS:                          8.4    x     )-----------( x        ( 2022 07:53 )             27.6

## 2022-04-12 NOTE — DISCHARGE NOTE OB - CARE PLAN
1 Principal Discharge DX:	 (normal spontaneous vaginal delivery)  Assessment and plan of treatment:	Patient should transition to regular activity level. Resume regular diet. Patient should follow up with her OB for a postpartum checkup 3-6 weeks after delivery. Patient should call her doctor sooner if she develops a fever or uncontrolled vaginal bleeding. Please call sooner if there are any other concerns.

## 2022-04-12 NOTE — DISCHARGE NOTE OB - PATIENT PORTAL LINK FT
You can access the FollowMyHealth Patient Portal offered by [facility] by registering at the following website: [email or web address]. By joining [facility]’s FollowMyHealth portal, you will also be able to view your health information using other applications (apps) compatible with our system.

## 2022-04-12 NOTE — DISCHARGE NOTE OB - CARE PROVIDER_API CALL
Lesley Braxton)  Obstetrics and Gynecology  [address], [address]  [place] [address]  Phone: [phone]  Fax: [phone]  Established Patient  Follow Up Time:

## 2022-04-12 NOTE — DISCHARGE NOTE OB - MEDICATION SUMMARY - MEDICATIONS TO TAKE

## 2022-04-12 NOTE — DISCHARGE NOTE OB - NS MD DC FALL RISK RISK
For information on Fall & Injury Prevention, visit: [email or web address] OR  [email or web address] OR  https://www.cdc.gov/steadi/patient.html

## 2022-04-12 NOTE — DISCHARGE NOTE OB - HOSPITAL COURSE
She is a 36 yo now  who presented at 39w6d GA in active labor and had a normal delivery. She had a normal postpartum course and was discharged home in stable condition. Upon discharge she was voiding, tolerating PO, and ambulating.

## 2022-04-12 NOTE — DISCHARGE NOTE OB - MATERIALS PROVIDED
Vaccinations/[facility]  Screening Program/  Immunization Record/Breastfeeding Log/Breastfeeding Mother’s Support Group Information/Guide to Postpartum Care/[facility] Hearing Screen Program/Back To Sleep Handout/Shaken Baby Prevention Handout/Breastfeeding Guide and Packet

## 2022-04-17 DIAGNOSIS — Z3A.39 39 WEEKS GESTATION OF PREGNANCY: ICD-10-CM
